# Patient Record
Sex: MALE | URBAN - METROPOLITAN AREA
[De-identification: names, ages, dates, MRNs, and addresses within clinical notes are randomized per-mention and may not be internally consistent; named-entity substitution may affect disease eponyms.]

---

## 2018-12-19 NOTE — PATIENT DISCUSSION
TRAUMATIC THIRD NERVE (CN III) PALSY:  I have explained the findings and the pathophysiology of an traumatic third nerve palsy to the patient. I have explained that this condition may resolve on its own with time, but that the prognosis is quite uncertain given the history of trauma. Aberrant regeneration may be a significant concern.

## 2019-01-02 ENCOUNTER — IMPORTED ENCOUNTER (OUTPATIENT)
Dept: URBAN - METROPOLITAN AREA CLINIC 9 | Facility: CLINIC | Age: 64
End: 2019-01-02

## 2021-03-03 ENCOUNTER — IMPORTED ENCOUNTER (OUTPATIENT)
Dept: URBAN - METROPOLITAN AREA CLINIC 9 | Facility: CLINIC | Age: 66
End: 2021-03-03

## 2021-10-19 ASSESSMENT — VISUAL ACUITY
OS_CC: 20/25 SN
OD_CC: 20/20 SN
OD_SC: 20/20 -2 SN
OD_SC: 20/30 SN
OS_SC: 20/30 - SN
OS_SC: 20/20 -2 SN

## 2021-10-19 ASSESSMENT — TONOMETRY
OD_IOP_MMHG: 16
OS_IOP_MMHG: 16
OS_IOP_MMHG: 17
OD_IOP_MMHG: 17

## 2022-10-24 ENCOUNTER — ESTABLISHED PATIENT (OUTPATIENT)
Dept: URBAN - METROPOLITAN AREA CLINIC 17 | Facility: CLINIC | Age: 67
End: 2022-10-24

## 2022-10-24 DIAGNOSIS — E11.9: ICD-10-CM

## 2022-10-24 DIAGNOSIS — H25.13: ICD-10-CM

## 2022-10-24 DIAGNOSIS — H11.043: ICD-10-CM

## 2022-10-24 PROCEDURE — 92015 DETERMINE REFRACTIVE STATE: CPT

## 2022-10-24 PROCEDURE — 92014 COMPRE OPH EXAM EST PT 1/>: CPT

## 2022-10-24 ASSESSMENT — VISUAL ACUITY
OD_GLARE: 20/25-2
OS_GLARE: 20/40-2
OD_CC: J2
OS_CC: J1
OS_PH: 20/20-1
OD_SC: 20/25-2
OS_SC: 20/40

## 2022-10-24 ASSESSMENT — KERATOMETRY
OD_K1POWER_DIOPTERS: 44.25
OS_K1POWER_DIOPTERS: 44.00
OS_AXISANGLE2_DEGREES: 70
OS_AXISANGLE_DEGREES: 160
OS_K2POWER_DIOPTERS: 44.25
OD_K2POWER_DIOPTERS: 44.75
OD_AXISANGLE2_DEGREES: 115
OD_AXISANGLE_DEGREES: 25

## 2022-10-24 ASSESSMENT — TONOMETRY
OD_IOP_MMHG: 13
OS_IOP_MMHG: 13

## 2023-06-15 ENCOUNTER — FOLLOW UP (OUTPATIENT)
Dept: URBAN - METROPOLITAN AREA CLINIC 12 | Facility: CLINIC | Age: 68
End: 2023-06-15

## 2023-06-15 DIAGNOSIS — H11.043: ICD-10-CM

## 2023-06-15 DIAGNOSIS — H25.13: ICD-10-CM

## 2023-06-15 PROCEDURE — 99213 OFFICE O/P EST LOW 20 MIN: CPT

## 2023-06-15 ASSESSMENT — KERATOMETRY
OS_AXISANGLE_DEGREES: 160
OD_K2POWER_DIOPTERS: 44.75
OS_K1POWER_DIOPTERS: 44.00
OD_AXISANGLE2_DEGREES: 115
OS_K2POWER_DIOPTERS: 44.25
OD_AXISANGLE_DEGREES: 25
OD_K1POWER_DIOPTERS: 44.25
OS_AXISANGLE2_DEGREES: 70

## 2023-06-15 ASSESSMENT — VISUAL ACUITY
OD_SC: 20/25-2
OS_SC: 20/40

## 2023-06-15 ASSESSMENT — TONOMETRY
OS_IOP_MMHG: 20
OD_IOP_MMHG: 19

## 2025-06-17 ENCOUNTER — COMPREHENSIVE EXAM (OUTPATIENT)
Age: 70
End: 2025-06-17

## 2025-06-17 DIAGNOSIS — H52.4: ICD-10-CM

## 2025-06-17 PROCEDURE — 92014 COMPRE OPH EXAM EST PT 1/>: CPT

## 2025-06-17 PROCEDURE — 92015 DETERMINE REFRACTIVE STATE: CPT
